# Patient Record
Sex: FEMALE | Race: WHITE | NOT HISPANIC OR LATINO | Employment: UNEMPLOYED | URBAN - METROPOLITAN AREA
[De-identification: names, ages, dates, MRNs, and addresses within clinical notes are randomized per-mention and may not be internally consistent; named-entity substitution may affect disease eponyms.]

---
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
## 2023-02-13 DIAGNOSIS — F41.1 GAD (GENERALIZED ANXIETY DISORDER): ICD-10-CM

## 2023-02-14 RX ORDER — CLONIDINE HYDROCHLORIDE 0.1 MG/1
0.1 TABLET ORAL AT BEDTIME
Qty: 90 TABLET | Refills: 1 | Status: SHIPPED | OUTPATIENT
Start: 2023-02-14 | End: 2023-07-26

## 2023-02-14 NOTE — TELEPHONE ENCOUNTER
"Requested Prescriptions   Pending Prescriptions Disp Refills     cloNIDine (CATAPRES) 0.1 MG tablet [Pharmacy Med Name: CLONIDINE HCL 0.1 MG TABLET] 90 tablet 1     Sig: TAKE 1 TABLET (0.1 MG) BY MOUTH AT BEDTIME       Central Acting Antiadrenergic Agents Failed - 2/13/2023 12:43 AM        Failed - Normal serum creatinine on file within past 12 months     Recent Labs   Lab Test 02/12/21  0920   CR 0.70       Ok to refill medication if creatinine is low          Passed - Blood pressure under 140/90 in past 12 months     BP Readings from Last 3 Encounters:   05/23/22 112/68   03/23/22 110/72   02/14/22 118/68                 Passed - Patient is 6 years of age or older        Passed - Recent (12 mo) or future (30 days) visit within the authorizing provider's specialty     Patient has had an office visit with the authorizing provider or a provider within the authorizing providers department within the previous 12 mos or has a future within next 30 days. See \"Patient Info\" tab in inbasket, or \"Choose Columns\" in Meds & Orders section of the refill encounter.              Passed - Medication is active on med list        Passed - Patient not pregnant        Passed - No positive pregnancy test on file in past 12 months       Antiadrenergic Antihypertensives Failed - 2/13/2023 12:43 AM        Failed - Blood pressure less than 140/90 in past 6 months     BP Readings from Last 3 Encounters:   05/23/22 112/68   03/23/22 110/72   02/14/22 118/68                 Failed - Normal serum creatinine on file in past 12 months     Recent Labs   Lab Test 02/12/21  0920   CR 0.70       Ok to refill medication if creatinine is low          Passed - Medication is active on med list        Passed - Patient is age 18 or older        Passed - No active pregnancy on record        Passed - No positive pregnancy test within past 12 months        Passed - Recent (6 mo) or future (30 days) visit within the authorizing provider's specialty     " "Patient had office visit in the last 6 months or has a visit in the next 30 days with authorizing provider or within the authorizing provider's specialty.  See \"Patient Info\" tab in inbasket, or \"Choose Columns\" in Meds & Orders section of the refill encounter.             Routing refill request to provider for review/approval because medication did not pass protocol.    Pt had a appointment on 12/22/22    Molly Camejo RN  [facility]   "

## 2023-02-23 DIAGNOSIS — N94.6 DYSMENORRHEA: ICD-10-CM

## 2023-02-23 RX ORDER — LEVONORGESTREL AND ETHINYL ESTRADIOL 0.15-0.03
KIT ORAL
Qty: 91 TABLET | Refills: 5 | Status: SHIPPED | OUTPATIENT
Start: 2023-02-23 | End: 2023-07-26

## 2023-02-23 NOTE — TELEPHONE ENCOUNTER
"Requested Prescriptions   Pending Prescriptions Disp Refills     levonorgestrel-ethinyl estradiol (SEASONALE) 0.15-0.03 MG tablet [Pharmacy Med Name: LEVONOR-ETH ESTRAD 0.15-0.03] 91 tablet 5     Sig: TAKE 1 TABLET BY MOUTH EVERY DAY       Contraceptives Protocol Passed - 2/23/2023 12:46 AM        Passed - Patient is not a current smoker if age is 35 or older        Passed - Recent (12 mo) or future (30 days) visit within the authorizing provider's specialty     Patient has had an office visit with the authorizing provider or a provider within the authorizing providers department within the previous 12 mos or has a future within next 30 days. See \"Patient Info\" tab in inbasket, or \"Choose Columns\" in Meds & Orders section of the refill encounter.              Passed - Medication is active on med list        Passed - No active pregnancy on record        Passed - No positive pregnancy test in past 12 months           Patient due for annual, [employer] message sent to schedule.    Routing refill request to provider for review/approval because:  More than 1 year since annual visit.  mychart sent to schedule.    Thanks!  Conrad Mtz RN   [facility]    "

## 2023-03-04 DIAGNOSIS — F41.1 GAD (GENERALIZED ANXIETY DISORDER): ICD-10-CM

## 2023-03-07 RX ORDER — ESCITALOPRAM OXALATE 20 MG/1
TABLET ORAL
Qty: 90 TABLET | Refills: 1 | Status: SHIPPED | OUTPATIENT
Start: 2023-03-07 | End: 2023-07-10

## 2023-04-16 ENCOUNTER — HEALTH MAINTENANCE LETTER (OUTPATIENT)
Age: 18
End: 2023-04-16

## 2023-07-10 DIAGNOSIS — F41.1 GAD (GENERALIZED ANXIETY DISORDER): ICD-10-CM

## 2023-07-10 NOTE — TELEPHONE ENCOUNTER
"Requested Prescriptions   Pending Prescriptions Disp Refills     escitalopram (LEXAPRO) 20 MG tablet 90 tablet 1     Sig: Take 1 tablet (20 mg) by mouth daily       SSRIs Protocol Passed - 7/10/2023  2:11 PM        Passed - Recent (12 mo) or future (30 days) visit within the authorizing provider's specialty     Patient has had an office visit with the authorizing provider or a provider within the authorizing providers department within the previous 12 mos or has a future within next 30 days. See \"Patient Info\" tab in inbasket, or \"Choose Columns\" in Meds & Orders section of the refill encounter.              Passed - Medication is active on med list        Passed - Patient is age 18 or older        Passed - No active pregnancy on record        Passed - No positive pregnancy test in last 12 months           Routing refill request to provider for review/approval because:  Drug interaction        Eliezer Cross RN  [facility]     "

## 2023-07-11 RX ORDER — ESCITALOPRAM OXALATE 20 MG/1
20 TABLET ORAL DAILY
Qty: 90 TABLET | Refills: 1 | Status: SHIPPED | OUTPATIENT
Start: 2023-07-11 | End: 2023-07-26

## 2023-07-23 ASSESSMENT — ENCOUNTER SYMPTOMS
HEARTBURN: 0
MYALGIAS: 0
DIZZINESS: 0
ARTHRALGIAS: 0
SHORTNESS OF BREATH: 0
HEADACHES: 1
PALPITATIONS: 0
DIARRHEA: 1
WEAKNESS: 0
EYE PAIN: 0
HEMATURIA: 0
JOINT SWELLING: 0
SORE THROAT: 0
COUGH: 0
FEVER: 0
PARESTHESIAS: 0
CHILLS: 0
CONSTIPATION: 1
NAUSEA: 0
HEMATOCHEZIA: 1
BREAST MASS: 0
ABDOMINAL PAIN: 1
NERVOUS/ANXIOUS: 1
DYSURIA: 0
FREQUENCY: 0

## 2023-07-26 ENCOUNTER — OFFICE VISIT (OUTPATIENT)
Dept: FAMILY MEDICINE | Facility: CLINIC | Age: 18
End: 2023-07-26
Payer: COMMERCIAL

## 2023-07-26 VITALS
WEIGHT: 177 LBS | BODY MASS INDEX: 32.57 KG/M2 | SYSTOLIC BLOOD PRESSURE: 110 MMHG | TEMPERATURE: 97.2 F | OXYGEN SATURATION: 96 % | DIASTOLIC BLOOD PRESSURE: 71 MMHG | HEIGHT: 62 IN | HEART RATE: 97 BPM | RESPIRATION RATE: 16 BRPM

## 2023-07-26 DIAGNOSIS — F41.1 GAD (GENERALIZED ANXIETY DISORDER): ICD-10-CM

## 2023-07-26 DIAGNOSIS — Z11.3 SCREENING FOR STDS (SEXUALLY TRANSMITTED DISEASES): ICD-10-CM

## 2023-07-26 DIAGNOSIS — G43.809 OTHER MIGRAINE WITHOUT STATUS MIGRAINOSUS, NOT INTRACTABLE: ICD-10-CM

## 2023-07-26 DIAGNOSIS — Z00.00 ROUTINE GENERAL MEDICAL EXAMINATION AT A HEALTH CARE FACILITY: Primary | ICD-10-CM

## 2023-07-26 DIAGNOSIS — R63.5 WEIGHT GAIN: ICD-10-CM

## 2023-07-26 DIAGNOSIS — F33.0 MAJOR DEPRESSIVE DISORDER, RECURRENT EPISODE, MILD (H): ICD-10-CM

## 2023-07-26 DIAGNOSIS — Z30.09 FAMILY PLANNING: ICD-10-CM

## 2023-07-26 DIAGNOSIS — N94.6 DYSMENORRHEA: ICD-10-CM

## 2023-07-26 LAB
C TRACH DNA SPEC QL NAA+PROBE: NEGATIVE
FASTING STATUS PATIENT QL REPORTED: NORMAL
GLUCOSE SERPL-MCNC: 96 MG/DL (ref 70–99)
HBA1C MFR BLD: 5.6 % (ref 0–5.6)
HCV AB SERPL QL IA: NONREACTIVE
HIV 1+2 AB+HIV1 P24 AG SERPL QL IA: NONREACTIVE
N GONORRHOEA DNA SPEC QL NAA+PROBE: NEGATIVE
T PALLIDUM AB SER QL: NONREACTIVE
TSH SERPL DL<=0.005 MIU/L-ACNC: 1.49 UIU/ML (ref 0.5–4.3)

## 2023-07-26 PROCEDURE — 87591 N.GONORRHOEAE DNA AMP PROB: CPT | Performed by: FAMILY MEDICINE

## 2023-07-26 PROCEDURE — 87491 CHLMYD TRACH DNA AMP PROBE: CPT | Performed by: FAMILY MEDICINE

## 2023-07-26 PROCEDURE — 82947 ASSAY GLUCOSE BLOOD QUANT: CPT | Performed by: FAMILY MEDICINE

## 2023-07-26 PROCEDURE — 84443 ASSAY THYROID STIM HORMONE: CPT | Performed by: FAMILY MEDICINE

## 2023-07-26 PROCEDURE — 86803 HEPATITIS C AB TEST: CPT | Performed by: FAMILY MEDICINE

## 2023-07-26 PROCEDURE — 99395 PREV VISIT EST AGE 18-39: CPT | Performed by: FAMILY MEDICINE

## 2023-07-26 PROCEDURE — 87389 HIV-1 AG W/HIV-1&-2 AB AG IA: CPT | Performed by: FAMILY MEDICINE

## 2023-07-26 PROCEDURE — 86780 TREPONEMA PALLIDUM: CPT | Performed by: FAMILY MEDICINE

## 2023-07-26 PROCEDURE — 96127 BRIEF EMOTIONAL/BEHAV ASSMT: CPT | Performed by: FAMILY MEDICINE

## 2023-07-26 PROCEDURE — 36415 COLL VENOUS BLD VENIPUNCTURE: CPT | Performed by: FAMILY MEDICINE

## 2023-07-26 PROCEDURE — 83036 HEMOGLOBIN GLYCOSYLATED A1C: CPT | Performed by: FAMILY MEDICINE

## 2023-07-26 RX ORDER — SUMATRIPTAN 50 MG/1
50-100 TABLET, FILM COATED ORAL
Qty: 18 TABLET | Refills: 3 | Status: SHIPPED | OUTPATIENT
Start: 2023-07-26

## 2023-07-26 RX ORDER — LEVONORGESTREL AND ETHINYL ESTRADIOL 0.15-0.03
1 KIT ORAL DAILY
Qty: 91 TABLET | Refills: 5 | Status: SHIPPED | OUTPATIENT
Start: 2023-07-26

## 2023-07-26 RX ORDER — ESCITALOPRAM OXALATE 20 MG/1
20 TABLET ORAL DAILY
Qty: 90 TABLET | Refills: 1 | Status: SHIPPED | OUTPATIENT
Start: 2023-07-26

## 2023-07-26 RX ORDER — CLONIDINE HYDROCHLORIDE 0.1 MG/1
0.1 TABLET ORAL AT BEDTIME
Qty: 90 TABLET | Refills: 1 | Status: SHIPPED | OUTPATIENT
Start: 2023-07-26 | End: 2024-02-22

## 2023-07-26 ASSESSMENT — ENCOUNTER SYMPTOMS
DIARRHEA: 1
SORE THROAT: 0
EYE PAIN: 0
FEVER: 0
CONSTIPATION: 1
ARTHRALGIAS: 0
WEAKNESS: 0
HEARTBURN: 0
ABDOMINAL PAIN: 1
DYSURIA: 0
NERVOUS/ANXIOUS: 1
COUGH: 0
HEMATURIA: 0
HEADACHES: 1
MYALGIAS: 0
CHILLS: 0
FREQUENCY: 0
PARESTHESIAS: 0
HEMATOCHEZIA: 1
BREAST MASS: 0
JOINT SWELLING: 0
NAUSEA: 0
DIZZINESS: 0
PALPITATIONS: 0
SHORTNESS OF BREATH: 0

## 2023-07-26 ASSESSMENT — ANXIETY QUESTIONNAIRES
4. TROUBLE RELAXING: NOT AT ALL
3. WORRYING TOO MUCH ABOUT DIFFERENT THINGS: NOT AT ALL
1. FEELING NERVOUS, ANXIOUS, OR ON EDGE: NOT AT ALL
GAD7 TOTAL SCORE: 3
6. BECOMING EASILY ANNOYED OR IRRITABLE: SEVERAL DAYS
IF YOU CHECKED OFF ANY PROBLEMS ON THIS QUESTIONNAIRE, HOW DIFFICULT HAVE THESE PROBLEMS MADE IT FOR YOU TO DO YOUR WORK, TAKE CARE OF THINGS AT HOME, OR GET ALONG WITH OTHER PEOPLE: SOMEWHAT DIFFICULT
5. BEING SO RESTLESS THAT IT IS HARD TO SIT STILL: SEVERAL DAYS
7. FEELING AFRAID AS IF SOMETHING AWFUL MIGHT HAPPEN: SEVERAL DAYS
GAD7 TOTAL SCORE: 3
2. NOT BEING ABLE TO STOP OR CONTROL WORRYING: NOT AT ALL

## 2023-07-26 ASSESSMENT — PATIENT HEALTH QUESTIONNAIRE - PHQ9
SUM OF ALL RESPONSES TO PHQ QUESTIONS 1-9: 9
10. IF YOU CHECKED OFF ANY PROBLEMS, HOW DIFFICULT HAVE THESE PROBLEMS MADE IT FOR YOU TO DO YOUR WORK, TAKE CARE OF THINGS AT HOME, OR GET ALONG WITH OTHER PEOPLE: SOMEWHAT DIFFICULT
SUM OF ALL RESPONSES TO PHQ QUESTIONS 1-9: 9

## 2023-07-26 ASSESSMENT — PAIN SCALES - GENERAL: PAINLEVEL: NO PAIN (1)

## 2023-07-26 NOTE — PATIENT INSTRUCTIONS
Preventive Health Recommendations  Female Ages 18 to 20     Yearly exam:   See your health care provider every year in order to  Review health changes.   Discuss preventive care.    Review your medicines if your doctor has prescribed any.    You should be tested each year for STDs (sexually transmitted diseases).     After age 20, talk to your provider about how often you should have cholesterol testing.    If you are at risk for diabetes, you should have a diabetes test (fasting glucose).     Shots:   Get a flu shot each year.   Get a tetanus shot every 10 years.   Consider getting the shot (vaccine) that prevents cervical cancer (Gardasil).    Nutrition:   Eat at least 5 servings of fruits and vegetables each day.  Eat whole-grain bread, whole-wheat pasta and brown rice instead of white grains and rice.  Get adequate Calcium and Vitamin D.     Lifestyle  Exercise at least 150 minutes a week each week (30 minutes a day, 5 days a week). This will help you control your weight and prevent disease.  No smoking.   Wear sunscreen to prevent skin cancer.  See your dentist every six months for an exam and cleaning.

## 2023-07-26 NOTE — PROGRESS NOTES
SUBJECTIVE:   CC: lisa is an 17 year old who presents for preventive health visit.       7/26/2023     7:40 AM   Additional Questions   Roomed by Regine     Healthy Habits:     Getting at least 3 servings of Calcium per day:  Yes    Bi-annual eye exam:  Yes    Dental care twice a year:  Yes    Sleep apnea or symptoms of sleep apnea:  None    Diet:  Regular (no restrictions)    Frequency of exercise:  2-3 days/week    Duration of exercise:  30-45 minutes    Taking medications regularly:  Yes    Medication side effects:  None    Additional concerns today:  Yes  Known recurrent depression & anxiety.  Lexapro helps  Visiting parents for summer from  [place] [employer]  triggered last week- otherwise depression  well controlled. will find therapist in school  in [place] . she denies suicidal thoughts or ideation.reports no side effects from medications. Would like to continue.      Wants refill on oral contraceptive pills- also interested in IUD- maybe will get it before going back to college in September 2023      Today's PHQ-9 Score:       7/26/2023     7:38 AM   PHQ-9 SCORE   PHQ-9 Total Score MyChart 9 (Mild depression)   PHQ-9 Total Score 9       Social History     Tobacco Use    Smoking status: Never    Smokeless tobacco: Never   Substance Use Topics    Alcohol use: No           7/23/2023     1:20 PM   Alcohol Use   Prescreen: >3 drinks/day or >7 drinks/week? Not Applicable       Reviewed orders with patient.  Reviewed health maintenance and updated orders accordingly - Yes  Labs reviewed in EPIC    Breast Cancer Screening:        History of abnormal Pap smear: NO - under age 21, PAP not appropriate for age     Reviewed and updated as needed this visit by clinical staff   Tobacco  Allergies  Meds  Problems  Med Hx  Surg Hx  Fam Hx          Reviewed and updated as needed this visit by Provider   Tobacco  Allergies  Meds  Problems  Med Hx  Surg Hx  Fam Hx             Review of Systems  "  Constitutional:  Negative for chills and fever.   HENT:  Negative for congestion, ear pain, hearing loss and sore throat.    Eyes:  Negative for pain and visual disturbance.   Respiratory:  Negative for cough and shortness of breath.    Cardiovascular:  Negative for chest pain, palpitations and peripheral edema.   Gastrointestinal:  Positive for abdominal pain, constipation, diarrhea and hematochezia. Negative for heartburn and nausea.   Breasts:  Positive for tenderness. Negative for breast mass and discharge.   Genitourinary:  Positive for vaginal discharge. Negative for dysuria, frequency, genital sores, hematuria, pelvic pain, urgency and vaginal bleeding.   Musculoskeletal:  Negative for arthralgias, joint swelling and myalgias.   Skin:  Negative for rash.   Neurological:  Positive for headaches. Negative for dizziness, weakness and paresthesias.   Psychiatric/Behavioral:  Negative for mood changes. The patient is nervous/anxious.         OBJECTIVE:   /71   Pulse 97   Temp 97.2  F (36.2  C) (Temporal)   Resp 16   Ht 1.577 m (5' 2.1\")   Wt 80.3 kg (177 lb)   LMP 06/26/2023   SpO2 96%   BMI 32.27 kg/m    Physical Exam  GENERAL: healthy, alert and no distress  EYES: Eyes grossly normal to inspection, PERRL and conjunctivae and sclerae normal  HENT: ear canals and TM's normal, nose and mouth without ulcers or lesions  NECK: no adenopathy, no asymmetry, masses, or scars and thyroid normal to palpation  RESP: lungs clear to auscultation - no rales, rhonchi or wheezes  BREAST: normal without masses, tenderness or nipple discharge and no palpable axillary masses or adenopathy  CV: regular rate and rhythm, normal S1 S2, no S3 or S4, no murmur, click or rub, no peripheral edema and peripheral pulses strong  ABDOMEN: soft, nontender, no hepatosplenomegaly, no masses and bowel sounds normal  MS: no gross musculoskeletal defects noted, no edema  SKIN: no suspicious lesions or rashes  NEURO: Normal strength " and tone, mentation intact and speech normal  PSYCH: mentation appears normal, affect normal/bright      3/23/2022     3:36 PM 12/15/2022     2:23 PM 7/26/2023     7:39 AM   JACQUELIN-7 SCORE   Total Score 11 (moderate anxiety) 3 (minimal anxiety) 3 (minimal anxiety)   Total Score 11 3 3         2/15/2022     9:19 AM 3/23/2022     3:36 PM 7/26/2023     7:38 AM   PHQ   PHQ-9 Total Score 17 9 9   Q9: Thoughts of better off dead/self-harm past 2 weeks Not at all Not at all Not at all         Diagnostic Test Results:    ASSESSMENT/PLAN:   (Z00.00) Routine general medical examination at a health care facility  (primary encounter diagnosis)    1. Routine general medical examination at a health care facility  - REVIEW OF HEALTH MAINTENANCE PROTOCOL ORDERS    2. Screening for STDs (sexually transmitted diseases)  Plan:  - NEISSERIA GONORRHOEA PCR; Future  - CHLAMYDIA TRACHOMATIS PCR; Future  - HIV Antigen Antibody Combo; Future  - Hepatitis C Screen Reflex to HCV RNA Quant and Genotype; Future  - Treponema Abs w Reflex to RPR and Titer; Future    3. JACQUELIN (generalized anxiety disorder)  Plan: clonidine helps witth night diaz .  - cloNIDine (CATAPRES) 0.1 MG tablet; Take 1 tablet (0.1 mg) by mouth At Bedtime  Dispense: 90 tablet; Refill: 1  - escitalopram (LEXAPRO) 20 MG tablet; Take 1 tablet (20 mg) by mouth daily  Dispense: 90 tablet; Refill: 1      3/23/2022     3:36 PM 12/15/2022     2:23 PM 7/26/2023     7:39 AM   JACQUELIN-7 SCORE   Total Score 11 (moderate anxiety) 3 (minimal anxiety) 3 (minimal anxiety)   Total Score 11 3 3        4. Dysmenorrhea  Refill on oral contraceptive pills given with side effects.  Additional planning counsleing completed for IUD insertion, options, procedures and pros and cons   - levonorgestrel-ethinyl estradiol (SEASONALE) 0.15-0.03 MG tablet; Take 1 tablet by mouth daily  Dispense: 91 tablet; Refill: 5    5. Other migraine without status migrainosus, not intractable  Advised to keep migraine headache  diary  Follow up if frequent migraine headache   - SUMAtriptan (IMITREX) 50 MG tablet; Take 1-2 tablets ( mg) by mouth at onset of headache for migraine May repeat in 2 hours. Max 8 tablets/24 hours.  Dispense: 18 tablet; Refill: 3    6. Major depressive disorder, recurrent episode, mild (H)  Plan: we discussed adding Wellbutrin- and starting therapy now.  Patient declined add on medications and willing to consider therapy.  Sh feels she was triggered and that's why feeling more depressed but safe      2/15/2022     9:19 AM 3/23/2022     3:36 PM 7/26/2023     7:38 AM   PHQ   PHQ-9 Total Score 17 9 9   Q9: Thoughts of better off dead/self-harm past 2 weeks Not at all Not at all Not at all        We also talked about weight shahzad about 20 lbs  Check for Diabetes  and thyroid   Encouraged regular excercise and clean diet  Follow up in few days for unresolved concerns      Patient has been advised of split billing requirements and indicates understanding: Yes      COUNSELING:  Reviewed preventive health counseling, as reflected in patient instructions       Regular exercise       Healthy diet/nutrition       Vision screening       Hearing screening       Contraception       Family planning        She reports that she has never smoked. She has never used smokeless tobacco.          Grace Israel MD  [facility]

## 2023-07-31 ENCOUNTER — VIRTUAL VISIT (OUTPATIENT)
Dept: FAMILY MEDICINE | Facility: CLINIC | Age: 18
End: 2023-07-31
Payer: COMMERCIAL

## 2023-07-31 DIAGNOSIS — G43.809 OTHER MIGRAINE WITHOUT STATUS MIGRAINOSUS, NOT INTRACTABLE: ICD-10-CM

## 2023-07-31 DIAGNOSIS — F41.1 GAD (GENERALIZED ANXIETY DISORDER): ICD-10-CM

## 2023-07-31 PROCEDURE — 99207 PR NO CHARGE LOS: CPT | Mod: VID | Performed by: FAMILY MEDICINE

## 2023-07-31 NOTE — PROGRESS NOTES
"lisa is a 17 year old who is being evaluated via a billable video visit.      How would you like to obtain your AVS? {AVS Preference:748723}  If the video visit is dropped, the invitation should be resent by: {video visit invitation (Optional) :528706}  Will anyone else be joining your video visit? {:244853}  {If patient encounters technical issues they should call [phone] :370041}        {PROVIDER CHARTING PREFERENCE:284173}    Subjective   lisa is a 17 year old, presenting for the following health issues:  No chief complaint on file.  {(!) Visit Details have not yet been documented.  Please enter Visit Details and then use this list to pull in documentation. (Optional):992429}    History of Present Illness       Headaches:   Since the patient's last clinic visit, headaches are: worsened  The patient is getting headaches:  Around 5 times a month  She is not able to do normal daily activities when she has a migraine.  The patient is taking the following rescue/relief medications:  Sumatriptan (Imitrex)   Patient states \"I get some relief\" from the rescue/relief medications.   The patient is taking the following medications to prevent migraines:  No medications to prevent migraines  In the past 4 weeks, the patient has gone to an Urgent Care or Emergency Room 0 times times due to headaches.    She eats 2-3 servings of fruits and vegetables daily.She consumes 0 sweetened beverage(s) daily.She exercises with enough effort to increase her heart rate 20 to 29 minutes per day.  She exercises with enough effort to increase her heart rate 3 or less days per week.   She is taking medications regularly.       {SUPERLIST (Optional):697759}  {additonal problems for provider to add (Optional):341823}      Review of Systems   {ROS COMP (Optional):048526}      Objective           Vitals:  No vitals were obtained today due to virtual visit.    Physical Exam   {video visit exam brief selected:660265}    {Diagnostic Test Results " "(Optional):075666}    {AMBULATORY ATTESTATION (Optional):835639}        Video-Visit Details    Type of service:  Video Visit     Originating Location (pt. Location): {video visit patient location:939393::\"Home\"}  {PROVIDER LOCATION On-site should be selected for visits conducted from your clinic location or adjoining [facility] hospital, academic office, or other nearby [facility] building. Off-site should be selected for all other provider locations, including home:992806}  Distant Location (provider location):  {virtual location provider:800263}  Platform used for Video Visit: {Virtual Visit Platforms:854151::\"[employer]\"}  Start 4:29, end 4:44      "

## 2023-08-01 PROBLEM — F41.1 GAD (GENERALIZED ANXIETY DISORDER): Status: ACTIVE | Noted: 2023-08-01

## 2023-08-01 PROBLEM — G43.809 OTHER MIGRAINE WITHOUT STATUS MIGRAINOSUS, NOT INTRACTABLE: Status: ACTIVE | Noted: 2023-08-01

## 2023-08-01 NOTE — PROGRESS NOTES
Seen by Dr Zuniga submitted by the patient for this visit:  Migraine Visit Questionnaire (Submitted on 7/26/2023)  Chief Complaint: Chronic problems general questions HPI Form  Headache Symptoms are: worsened  How often are you getting headaches or migraines? : around 5 times a month  Are you able to do normal daily activities when you have a migraine?: No  Migraine Rescue/Relief Medications:: sumatriptan (Imitrex)  Effectiveness of rescue/relief medications:: I get some relief  Migraine Preventative Medications:: no medications to prevent migraines  ER or UC Visits:: 0 times  General Questionnaire (Submitted on 7/26/2023)  Chief Complaint: Chronic problems general questions HPI Form  How many servings of fruits and vegetables do you eat daily?: 2-3  On average, how many sweetened beverages do you drink each day (Examples: soda, juice, sweet tea, etc.  Do NOT count diet or artificially sweetened beverages)?: 0  How many minutes a day do you exercise enough to make your heart beat faster?: 20 to 29  How many days a week do you exercise enough to make your heart beat faster?: 3 or less  How many days per week do you miss taking your medication?: 0

## 2024-02-21 DIAGNOSIS — F41.1 GAD (GENERALIZED ANXIETY DISORDER): ICD-10-CM

## 2024-02-22 RX ORDER — CLONIDINE HYDROCHLORIDE 0.1 MG/1
0.1 TABLET ORAL AT BEDTIME
Qty: 90 TABLET | Refills: 1 | Status: SHIPPED | OUTPATIENT
Start: 2024-02-22

## 2024-08-31 ENCOUNTER — HEALTH MAINTENANCE LETTER (OUTPATIENT)
Age: 19
End: 2024-08-31

## 2025-01-23 NOTE — PROGRESS NOTES
Answers for HPI/ROS submitted by the patient on 12/15/2022  JACQUELIN 7 TOTAL SCORE: 3  Depression/Anxiety: Anxiety  Anxiety since last: : good  Other associated symotome: : Yes  Significant life event: : No  Anxious:: No  Current substance use:: No  How many servings of fruits and vegetables do you eat daily?: 0-1  On average, how many sweetened beverages do you drink each day (Examples: soda, juice, sweet tea, etc.  Do NOT count diet or artificially sweetened beverages)?: 0  How many minutes a day do you exercise enough to make your heart beat faster?: 20 to 29  How many days a week do you exercise enough to make your heart beat faster?: 3 or less  How many days per week do you miss taking your medication?: 0    lisa is a 17 year old who is being evaluated via a billable video visit.      How would you like to obtain your AVS? MyChart  If the video visit is dropped, the invitation should be resent by: Text to cell phone: [phone]  Will anyone else be joining your video visit? No        Assessment & Plan   Problem List Items Addressed This Visit    None  Visit Diagnoses     Insomnia, unspecified type    -  Primary    Relevant Medications    traZODone (DESYREL) 50 MG tablet    Other migraine without status migrainosus, not intractable        Relevant Medications    traZODone (DESYREL) 50 MG tablet    SUMAtriptan (IMITREX) 50 MG tablet           25 minutes spent on the date of the encounter doing chart review, history and exam, documentation and further activities per the note       See Patient Instructions    No follow-ups on file.    [facility]    Subjective   lisa is a 17 year old, presenting for the following health issues:  No chief complaint on file.      HPI     Anxiety Follow-Up    How are you doing with your anxiety since your last visit? Improved     Are you having other symptoms that might be associated with anxiety? Yes:  difficulty sleeping    Have you  had a significant life event? OTHER: started school, freshman year, going well     Are you feeling depressed? No    Do you have any concerns with your use of alcohol or other drugs? No    Social History     Tobacco Use     Smoking status: Never     Smokeless tobacco: Never   Substance Use Topics     Alcohol use: No     Drug use: No     JACQUELIN-7 SCORE 2/15/2022 3/23/2022 12/15/2022   Total Score - 11 (moderate anxiety) 3 (minimal anxiety)   Total Score 14 11 3     PHQ 2/15/2022 3/23/2022   PHQ-9 Total Score 17 9   Q9: Thoughts of better off dead/self-harm past 2 weeks Not at all Not at all     Last PHQ-9 3/23/2022   1.  Little interest or pleasure in doing things 1   2.  Feeling down, depressed, or hopeless 1   3.  Trouble falling or staying asleep, or sleeping too much 3   4.  Feeling tired or having little energy 1   5.  Poor appetite or overeating 0   6.  Feeling bad about yourself 1   7.  Trouble concentrating 1   8.  Moving slowly or restless 1   Q9: Thoughts of better off dead/self-harm past 2 weeks 0   PHQ-9 Total Score 9   Difficulty at work, home, or with people -     JACQUELIN-7  12/15/2022   1. Feeling nervous, anxious, or on edge 0   2. Not being able to stop or control worrying 0   3. Worrying too much about different things 1   4. Trouble relaxing 1   5. Being so restless that it is hard to sit still 1   6. Becoming easily annoyed or irritable 0   7. Feeling afraid, as if something awful might happen 0   JACQUELIN-7 Total Score 3   If you checked any problems, how difficult have they made it for you to do your work, take care of things at home, or get along with other people? Not difficult at all       Migraine     Since your last clinic visit, how have your headaches changed?  Worsened    How often are you getting headaches or migraines? Up to once per week     Are you able to do normal daily activities when you have a migraine? No    Are you taking rescue/relief medications? (Select all that apply) ibuprofen (Advil,  Motrin) and Tylenol    How helpful is your rescue/relief medication?  I get some relief    Are you taking any medications to prevent migraines? (Select all that apply)  No    In the past 4 weeks, how often have you gone to urgent care or the emergency room because of your headaches?  0        Review of Systems   Constitutional, HEENT, cardiovascular, pulmonary, gi and gu systems are negative, except as otherwise noted.      Objective           Vitals:  No vitals were obtained today due to virtual visit.    Physical Exam   GENERAL: Healthy, alert and no distress  EYES: Eyes grossly normal to inspection.  No discharge or erythema, or obvious scleral/conjunctival abnormalities.  RESP: No audible wheeze, cough, or visible cyanosis.  No visible retractions or increased work of breathing.    SKIN: Visible skin clear. No significant rash, abnormal pigmentation or lesions.  NEURO: Cranial nerves grossly intact.  Mentation and speech appropriate for age.  PSYCH: Mentation appears normal, affect normal/bright, judgement and insight intact, normal speech and appearance well-groomed.    Office Visit on 02/12/2021   Component Date Value Ref Range Status     Cholesterol 02/12/2021 183 (H)  <170 mg/dL Final    Comment: Borderline high:  170-199 mg/dl  High:            >199 mg/dl       Triglycerides 02/12/2021 73  <90 mg/dL Final    Non Fasting     HDL Cholesterol 02/12/2021 59  >45 mg/dL Final     LDL Cholesterol Calculated 02/12/2021 109  <110 mg/dL Final    Comment: Borderline high:  110-129 mg/dl  High:            >129 mg/dl       Non HDL Cholesterol 02/12/2021 124 (H)  <120 mg/dL Final    Comment: Borderline high:  120-144 mg/dl  High:            >144 mg/dl       WBC 02/12/2021 5.4  4.0 - 11.0 10e9/L Final     RBC Count 02/12/2021 4.18  3.7 - 5.3 10e12/L Final     Hemoglobin 02/12/2021 13.5  11.7 - 15.7 g/dL Final     Hematocrit 02/12/2021 40.8  35.0 - 47.0 % Final     MCV 02/12/2021 98  77 - 100 fl Final     MCH 02/12/2021  32.3  26.5 - 33.0 pg Final     MCHC 02/12/2021 33.1  31.5 - 36.5 g/dL Final     RDW 02/12/2021 12.6  10.0 - 15.0 % Final     Platelet Count 02/12/2021 299  150 - 450 10e9/L Final     Sodium 02/12/2021 139  133 - 144 mmol/L Final     Potassium 02/12/2021 4.0  3.4 - 5.3 mmol/L Final     Chloride 02/12/2021 108  96 - 110 mmol/L Final     Carbon Dioxide 02/12/2021 24  20 - 32 mmol/L Final     Anion Gap 02/12/2021 7  3 - 14 mmol/L Final     Glucose 02/12/2021 80  70 - 99 mg/dL Final    Non Fasting     Urea Nitrogen 02/12/2021 9  7 - 19 mg/dL Final     Creatinine 02/12/2021 0.70  0.50 - 1.00 mg/dL Final     GFR Estimate 02/12/2021 GFR not calculated, patient <18 years old.  >60 mL/min/[1.73_m2] Final    Comment: Non  GFR Calc  Starting 12/18/2018, serum creatinine based estimated GFR (eGFR) will be   calculated using the Chronic Kidney Disease Epidemiology Collaboration   (CKD-EPI) equation.       GFR Estimate If Black 02/12/2021 GFR not calculated, patient <18 years old.  >60 mL/min/[1.73_m2] Final    Comment:  GFR Calc  Starting 12/18/2018, serum creatinine based estimated GFR (eGFR) will be   calculated using the Chronic Kidney Disease Epidemiology Collaboration   (CKD-EPI) equation.       Calcium 02/12/2021 9.4  8.5 - 10.1 mg/dL Final     Bilirubin Total 02/12/2021 0.3  0.2 - 1.3 mg/dL Final     Albumin 02/12/2021 3.7  3.4 - 5.0 g/dL Final     Protein Total 02/12/2021 7.1  6.8 - 8.8 g/dL Final     Alkaline Phosphatase 02/12/2021 41  40 - 150 U/L Final     ALT 02/12/2021 14  0 - 50 U/L Final     AST 02/12/2021 15  0 - 35 U/L Final     Vitamin D Deficiency screening 02/12/2021 21  20 - 75 ug/L Final    Comment: Season, race, dietary intake, and treatment affect the concentration of   25-hydroxy-Vitamin D. Values may decrease during winter months and increase   during summer months. Values 20-29 ug/L may indicate Vitamin D insufficiency   and values <20 ug/L may indicate Vitamin D  deficiency.  Vitamin D determination is routinely performed by an immunoassay specific for   25 hydroxyvitamin D3.  If an individual is on vitamin D2 (ergocalciferol)   supplementation, please specify 25 OH vitamin D2 and D3 level determination by   LCMSMS test [id number].       TSH 02/12/2021 1.71  0.40 - 4.00 mU/L Final     IGE 02/12/2021 13  0 - 114 KIU/L Final     Allergen A alternata 02/12/2021 <0.10  <0.10 KU(A)/L Final    Interpretation: None Detected     Allergen Cat Dander 02/12/2021 <0.10  <0.10 KU(A)/L Final    Interpretation: None Detected     Allergen C herbarum 02/12/2021 <0.10  <0.10 KU(A)/L Final    Interpretation: None Detected     Allergen Cockroach 02/12/2021 0.31 (H)  <0.10 KU(A)/L Final    Interpretation: Low     Allergen Fish(Cod) 02/12/2021 <0.10  <0.10 KU(A)/L Final    Interpretation: None Detected     Allergen, D Pteronyssinus 02/12/2021 0.30 (H)  <0.10 KU(A)/L Final    Interpretation: Low     Allergen D farinae 02/12/2021 0.24 (H)  <0.10 KU(A)/L Final    Interpretation: Low     Allergen Dog Dander 02/12/2021 <0.10  <0.10 KU(A)/L Final    Interpretation: None Detected     Allergen Egg White 02/12/2021 <0.10  <0.10 KU(A)/L Final    Interpretation: None Detected     Allergen Milk 02/12/2021 <0.10  <0.10 KU(A)/L Final    Interpretation: None Detected     Allergen, Mouse Urine 02/12/2021 <0.10  <0.10 KU(A)/L Final    Interpretation: None Detected     Allergen Peanut 02/12/2021 <0.10  <0.10 KU(A)/L Final    Interpretation: None Detected     Allergen Shrimp 02/12/2021 <0.10  <0.10 KU(A)/L Final    Interpretation: None Detected     Allergen Soybean IgE 02/12/2021 <0.10  <0.10 KU(A)/L Final    Interpretation: None Detected     Allergen, [place] 02/12/2021 <0.10  <0.10 KU(A)/L Final    Interpretation: None Detected     Allergen Wheat 02/12/2021 <0.10  <0.10 KU(A)/L Final    Interpretation: None Detected           Video-Visit Details    Type of service:  Video Visit     Originating Location (pt. Location):  Home  Distant Location (provider location):  On-site  Platform used for Video Visit: Sarah     [phone]